# Patient Record
Sex: FEMALE | Race: WHITE | Employment: FULL TIME | URBAN - METROPOLITAN AREA
[De-identification: names, ages, dates, MRNs, and addresses within clinical notes are randomized per-mention and may not be internally consistent; named-entity substitution may affect disease eponyms.]

---

## 2017-02-09 ENCOUNTER — OFFICE VISIT (OUTPATIENT)
Dept: URGENT CARE | Age: 42
End: 2017-02-09

## 2017-02-09 VITALS
TEMPERATURE: 98.2 F | HEART RATE: 92 BPM | HEIGHT: 66 IN | DIASTOLIC BLOOD PRESSURE: 80 MMHG | WEIGHT: 230 LBS | OXYGEN SATURATION: 100 % | SYSTOLIC BLOOD PRESSURE: 120 MMHG | BODY MASS INDEX: 36.96 KG/M2 | RESPIRATION RATE: 16 BRPM

## 2017-02-09 DIAGNOSIS — J02.9 SORE THROAT: ICD-10-CM

## 2017-02-09 DIAGNOSIS — J01.90 ACUTE SINUSITIS, RECURRENCE NOT SPECIFIED, UNSPECIFIED LOCATION: ICD-10-CM

## 2017-02-09 DIAGNOSIS — J02.0 STREP THROAT: Primary | ICD-10-CM

## 2017-02-09 DIAGNOSIS — R05.9 COUGH: ICD-10-CM

## 2017-02-09 LAB — S PYO AG THROAT QL: POSITIVE

## 2017-02-09 PROCEDURE — 99203 OFFICE O/P NEW LOW 30 MIN: CPT | Performed by: EMERGENCY MEDICINE

## 2017-02-09 PROCEDURE — 87880 STREP A ASSAY W/OPTIC: CPT | Performed by: EMERGENCY MEDICINE

## 2017-02-09 RX ORDER — LEVOFLOXACIN 500 MG/1
500 TABLET, FILM COATED ORAL DAILY
Qty: 10 TABLET | Refills: 0 | Status: SHIPPED | OUTPATIENT
Start: 2017-02-09 | End: 2017-02-19

## 2017-02-09 RX ORDER — BENZONATATE 200 MG/1
200 CAPSULE ORAL 3 TIMES DAILY PRN
Qty: 15 CAPSULE | Refills: 0 | Status: SHIPPED | OUTPATIENT
Start: 2017-02-09 | End: 2017-09-18

## 2017-02-09 ASSESSMENT — ENCOUNTER SYMPTOMS
EYE DISCHARGE: 0
SORE THROAT: 1
DIARRHEA: 0
NAUSEA: 0
RHINORRHEA: 1
ABDOMINAL PAIN: 0
SHORTNESS OF BREATH: 1
EYE PAIN: 0
COUGH: 1
SINUS PRESSURE: 1
WHEEZING: 1
VOMITING: 0

## 2017-03-07 ENCOUNTER — EMPLOYEE WELLNESS (OUTPATIENT)
Dept: OTHER | Age: 42
End: 2017-03-07

## 2017-09-05 DIAGNOSIS — S63.601A SPRAIN OF RIGHT THUMB, UNSPECIFIED SITE OF FINGER, INITIAL ENCOUNTER: Primary | ICD-10-CM

## 2017-09-05 PROCEDURE — L3908 WHO COCK-UP NONMOLDE PRE OTS: HCPCS | Performed by: ORTHOPAEDIC SURGERY

## 2017-09-08 ENCOUNTER — OFFICE VISIT (OUTPATIENT)
Dept: ORTHOPEDIC SURGERY | Age: 42
End: 2017-09-08

## 2017-09-08 DIAGNOSIS — M79.644 FINGER PAIN, RIGHT: Primary | ICD-10-CM

## 2017-09-08 DIAGNOSIS — S63.601D SPRAIN OF RIGHT THUMB, UNSPECIFIED SITE OF FINGER, SUBSEQUENT ENCOUNTER: ICD-10-CM

## 2017-09-08 PROCEDURE — 73140 X-RAY EXAM OF FINGER(S): CPT | Performed by: ORTHOPAEDIC SURGERY

## 2017-09-08 PROCEDURE — 99203 OFFICE O/P NEW LOW 30 MIN: CPT | Performed by: ORTHOPAEDIC SURGERY

## 2017-09-15 ENCOUNTER — TELEPHONE (OUTPATIENT)
Dept: ORTHOPEDIC SURGERY | Age: 42
End: 2017-09-15

## 2017-09-15 ENCOUNTER — OFFICE VISIT (OUTPATIENT)
Dept: ORTHOPEDIC SURGERY | Age: 42
End: 2017-09-15

## 2017-09-15 VITALS — HEIGHT: 66 IN | WEIGHT: 229.94 LBS | BODY MASS INDEX: 36.95 KG/M2

## 2017-09-15 DIAGNOSIS — S63.641D GAMEKEEPER'S THUMB OF RIGHT HAND, SUBSEQUENT ENCOUNTER: ICD-10-CM

## 2017-09-15 DIAGNOSIS — M79.5 FOREIGN BODY (FB) IN SOFT TISSUE: ICD-10-CM

## 2017-09-15 PROBLEM — S63.641A GAMEKEEPER'S THUMB OF RIGHT HAND: Status: ACTIVE | Noted: 2017-09-15

## 2017-09-15 PROCEDURE — 99214 OFFICE O/P EST MOD 30 MIN: CPT | Performed by: ORTHOPAEDIC SURGERY

## 2017-09-20 ENCOUNTER — HOSPITAL ENCOUNTER (OUTPATIENT)
Dept: SURGERY | Age: 42
Discharge: HOME | End: 2017-09-20
Attending: ORTHOPAEDIC SURGERY | Admitting: ORTHOPAEDIC SURGERY

## 2017-09-20 VITALS
HEIGHT: 66 IN | DIASTOLIC BLOOD PRESSURE: 75 MMHG | WEIGHT: 229 LBS | RESPIRATION RATE: 18 BRPM | TEMPERATURE: 99.2 F | BODY MASS INDEX: 36.8 KG/M2 | SYSTOLIC BLOOD PRESSURE: 133 MMHG | OXYGEN SATURATION: 97 % | HEART RATE: 89 BPM

## 2017-09-20 LAB — PREGNANCY, URINE: NEGATIVE

## 2017-09-20 RX ORDER — ONDANSETRON 2 MG/ML
4 INJECTION INTRAMUSCULAR; INTRAVENOUS
Status: ACTIVE | OUTPATIENT
Start: 2017-09-20 | End: 2017-09-20

## 2017-09-20 RX ORDER — HYDROCODONE BITARTRATE AND ACETAMINOPHEN 5; 325 MG/1; MG/1
1 TABLET ORAL EVERY 6 HOURS PRN
Qty: 28 TABLET | Refills: 0 | Status: SHIPPED | OUTPATIENT
Start: 2017-09-20 | End: 2017-09-27

## 2017-09-20 RX ORDER — MORPHINE SULFATE 2 MG/ML
2 INJECTION, SOLUTION INTRAMUSCULAR; INTRAVENOUS EVERY 5 MIN PRN
Status: DISCONTINUED | OUTPATIENT
Start: 2017-09-20 | End: 2017-09-21 | Stop reason: HOSPADM

## 2017-09-20 RX ORDER — HYDRALAZINE HYDROCHLORIDE 20 MG/ML
5 INJECTION INTRAMUSCULAR; INTRAVENOUS
Status: DISCONTINUED | OUTPATIENT
Start: 2017-09-20 | End: 2017-09-21 | Stop reason: HOSPADM

## 2017-09-20 RX ORDER — OXYCODONE HYDROCHLORIDE AND ACETAMINOPHEN 5; 325 MG/1; MG/1
1 TABLET ORAL PRN
Status: COMPLETED | OUTPATIENT
Start: 2017-09-20 | End: 2017-09-20

## 2017-09-20 RX ORDER — LABETALOL HYDROCHLORIDE 5 MG/ML
5 INJECTION, SOLUTION INTRAVENOUS EVERY 10 MIN PRN
Status: DISCONTINUED | OUTPATIENT
Start: 2017-09-20 | End: 2017-09-21 | Stop reason: HOSPADM

## 2017-09-20 RX ORDER — MORPHINE SULFATE 2 MG/ML
1 INJECTION, SOLUTION INTRAMUSCULAR; INTRAVENOUS EVERY 5 MIN PRN
Status: DISCONTINUED | OUTPATIENT
Start: 2017-09-20 | End: 2017-09-21 | Stop reason: HOSPADM

## 2017-09-20 RX ORDER — ONDANSETRON 4 MG/1
4 TABLET, FILM COATED ORAL EVERY 8 HOURS PRN
Qty: 10 TABLET | Refills: 1 | Status: SHIPPED | OUTPATIENT
Start: 2017-09-20

## 2017-09-20 RX ORDER — MEPERIDINE HYDROCHLORIDE 50 MG/ML
12.5 INJECTION INTRAMUSCULAR; INTRAVENOUS; SUBCUTANEOUS EVERY 5 MIN PRN
Status: DISCONTINUED | OUTPATIENT
Start: 2017-09-20 | End: 2017-09-21 | Stop reason: HOSPADM

## 2017-09-20 RX ORDER — SODIUM CHLORIDE 0.9 % (FLUSH) 0.9 %
10 SYRINGE (ML) INJECTION EVERY 12 HOURS SCHEDULED
Status: DISCONTINUED | OUTPATIENT
Start: 2017-09-20 | End: 2017-09-21 | Stop reason: HOSPADM

## 2017-09-20 RX ORDER — SODIUM CHLORIDE, SODIUM LACTATE, POTASSIUM CHLORIDE, CALCIUM CHLORIDE 600; 310; 30; 20 MG/100ML; MG/100ML; MG/100ML; MG/100ML
INJECTION, SOLUTION INTRAVENOUS CONTINUOUS
Status: DISCONTINUED | OUTPATIENT
Start: 2017-09-20 | End: 2017-09-21 | Stop reason: HOSPADM

## 2017-09-20 RX ORDER — OXYCODONE HYDROCHLORIDE AND ACETAMINOPHEN 5; 325 MG/1; MG/1
2 TABLET ORAL PRN
Status: COMPLETED | OUTPATIENT
Start: 2017-09-20 | End: 2017-09-20

## 2017-09-20 RX ORDER — LIDOCAINE HYDROCHLORIDE 10 MG/ML
0.3 INJECTION, SOLUTION EPIDURAL; INFILTRATION; INTRACAUDAL; PERINEURAL
Status: ACTIVE | OUTPATIENT
Start: 2017-09-20 | End: 2017-09-20

## 2017-09-20 RX ORDER — DIPHENHYDRAMINE HYDROCHLORIDE 50 MG/ML
12.5 INJECTION INTRAMUSCULAR; INTRAVENOUS
Status: ACTIVE | OUTPATIENT
Start: 2017-09-20 | End: 2017-09-20

## 2017-09-20 RX ORDER — SODIUM CHLORIDE 0.9 % (FLUSH) 0.9 %
10 SYRINGE (ML) INJECTION PRN
Status: DISCONTINUED | OUTPATIENT
Start: 2017-09-20 | End: 2017-09-21 | Stop reason: HOSPADM

## 2017-09-20 RX ADMIN — Medication 1 MG: at 14:20

## 2017-09-20 RX ADMIN — SODIUM CHLORIDE, SODIUM LACTATE, POTASSIUM CHLORIDE, CALCIUM CHLORIDE: 600; 310; 30; 20 INJECTION, SOLUTION INTRAVENOUS at 14:05

## 2017-09-20 RX ADMIN — Medication 0.5 MG: at 14:03

## 2017-09-20 RX ADMIN — OXYCODONE HYDROCHLORIDE AND ACETAMINOPHEN 1 TABLET: 5; 325 TABLET ORAL at 14:31

## 2017-09-20 RX ADMIN — SODIUM CHLORIDE, SODIUM LACTATE, POTASSIUM CHLORIDE, CALCIUM CHLORIDE: 600; 310; 30; 20 INJECTION, SOLUTION INTRAVENOUS at 11:55

## 2017-09-20 ASSESSMENT — PAIN SCALES - GENERAL
PAINLEVEL_OUTOF10: 5
PAINLEVEL_OUTOF10: 3
PAINLEVEL_OUTOF10: 4
PAINLEVEL_OUTOF10: 0

## 2017-09-20 ASSESSMENT — PAIN - FUNCTIONAL ASSESSMENT: PAIN_FUNCTIONAL_ASSESSMENT: 0-10

## 2017-09-21 DIAGNOSIS — S63.641A GAMEKEEPER'S THUMB OF RIGHT HAND, INITIAL ENCOUNTER: Primary | ICD-10-CM

## 2017-09-27 ENCOUNTER — HOSPITAL ENCOUNTER (OUTPATIENT)
Dept: OCCUPATIONAL THERAPY | Age: 42
Discharge: HOME | End: 2017-09-30
Admitting: ORTHOPAEDIC SURGERY

## 2017-09-27 NOTE — PLAN OF CARE
[facility], [address] [place] [address]  Phone: [phone]  Fax [phone]    Occupational Therapy/Hand Therapy Certification  Dear Referring Practitioner: Dr. Royal Acosta    We had the pleasure of evaluating the following patient for occupational therapy services at [address] [place] [address]. A summary of our findings can be found in the initial assessment below. This includes our plan of care. If you have any questions or concerns regarding these findings, please do not hesitate to contact me at the office phone number checked above. Thank you for the referral.     Physician Signature:_______________________________Date:__________________  By signing above (or electronic signature), therapists plan is approved by physician      Patient: Janel Sainz   : 1975   MRN: [id number]  Referring Physician: Referring Practitioner: Dr. Royal Acosta      Evaluation Date: 2017      Medical Diagnosis Information:  Diagnosis: S53. 31XA (ICD-10-CM) - Gamekeeper's thumb of right hand   Treatment Diagnosis: M25.641 right hand stiffness                                         Insurance information: OT Insurance Information: Meidcal [place]    Precautions/ Contra-indications: THUMB SPICA THERMO PLASTIC SPLINT, GENTLE ROM  Latex Allergy:  [x]No      []Yes  Pacemaker:  [x] No       [] Yes     Preferred Language for Healthcare:   [x]English       []other:    G-Code:  Applied on 2017   OT G-codes  Functional Assessment Tool Used: QuickDASH  Score: 86  Functional Limitation: Carrying, moving and handling objects  Carrying, Moving and Handling Objects Current Status (): At least 80 percent but less than 100 percent impaired, limited or restricted  Carrying, Moving and Handling Objects Goal Status ():  At least 20 percent but less than 40 percent impaired, limited or restricted      [x] Patient reported history, allergies, and medications reviewed - see intake form. SUBJECTIVE:  Date of injury:  8/28/17  Date of surgery: 9/20/17 S/P RIGHT THUMB UCL GAMEKEEPER'S REPAIR, REMOVAL DEEP FOREIGN BODY R LONG FINGERTIP    Background/Relevant Medical & Therapy History: Matti Strong going down steps on a cruise ship and had pain and swelling to her right thumb, placed in thumb spica brace when returning home, had MRI and surgical repair was deemed necessary.     Pain Scale: 3/10   [x]Constant      []Intermittent    []other:  Pain Location:  Right thumb  Easing factors: rest  Provocative factors: using right hand for things      Occupational Profile:  Home Enviroment: lives with  [x] spouse,  [x] family 12year old,  [] alone,  [] significant other,   [] other:    Occupation/School:  at [employer] Interests: nothing specific    Prior Level of Function: [x] Independent with ADLs/IADLs     [] Assistance needed (describe):    Patient-Identified Primary Performance Deficits (to be addressed in POC):   [x] bathing washing hair    [x] household tasks (cooking/cleaning) doing dishes   [] dressing    [] self feeding   [] grooming    [x] work/education difficulty writing and typing   [] functional mobility   [] sleeping/rest   [] toileting/hygiene   [] recreational activities   [] driving    [] community/social participation   [] other:     Comorbidities Affecting Functional Performance:     []Anxiety (F41.9)/Depression (F32.9)   []Diabetes Type 1(E10.65) or 2 (E11.65)   []Rheumatoid Arthritis (M05.9)  []Fibromyalgia (M79.7)  []Neuropathy(G60.9)  []Osteoarthritis(M19.91)  [x]None   []Other:     OBJECTIVE:   Date:  Hand Dominance:     [x]  Right    [] Left 9/27/2017     Objective Measures:    PAIN 3/10   Quick DASH  86%   Digit  ROM right hand     WNL except slight stiffness in middle DIP due to removal of piece of glass with this surgery   Thumb ROM MP  10/20  IP  0/10   Thumb opposition  R: barely to side of index  L:   Wrist ROM Ext/Flex WNL   Edema around thumb CMC/MCP Mild by visual inspection    strength in lbs R:defer  L:   Pinch Strengthin lbs: lat  R:defer  L:   Pinch Strength in lbs:  3 point R:defer  L:       Observations (including splints, bandages, incisions, scars):  Upon removal of post-op bandages,stitches in place to radial side of right thumb MCP joint, minimal bleeding on post-op bandages    Sensation:  [x] No reported deficits  [] Intact to light touch    [] [place] Jesus test completed, findings as noted:  [] Other:    Palpation: to surgical incision    Functional Mobility/Transfers/Gait:  [x] Independent - no significant gait deviations  [] Assistance needed   [] Assistive device used: Falls Risk Assessment (30 days):   [x] Falls Risk assessed and no intervention required. [] Falls Risk assessed and Patient requires intervention due to being higher risk   TUG score (>12s at risk):     [] Falls education provided, including      Review Of Systems (ROS): [x]Performed Review of systems (Integumentary, CardioPulmonary, Neurological) by intake and observation. Intake form has been scanned into medical record. Patient has been instructed to contact their primary care physician regarding ROS issues if not already being addressed at this time. ASSESSMENT:   This patient presents with signs and symptoms consistent with the medical diagnosis provided by the referring physician.      Impairments (physical, cognitive and/or psychosocial):  [] Decreased mobility   [x] Weakness    [] Hypersensitivity   [] Pain/tenderness   [x] Edema/swelling   [x] Decreased coordination (fine/gross motor)   [] Impaired body mechanics  [] Sensory loss  [] Loss of balance   [] Other:      Performance Deficits (to be addressed in plan of care):   [x] Bathing    [x] Household Tasks (cooking/cleaning)   [] Dressing    [] Self Feeding   [] Grooming    [] Work/Education   [] Functional Mobility   [] Sleeping/Rest   [] Toileting/Hygiene   [] Recreational Activities   [] Driving    [] Community/Social Participation   [] Other:     Rehab Potential:   [] Excellent [x] Good [] Fair  [] Poor     Barriers affecting rehab potential:  []Age    []Lack of Motivation   []Co-Morbidities  []Cognitive Function  []Environmental/home/work barriers  []Other: Tolerance of evaluation/treatment:    [] Excellent [x] Good [] Fair  [] Poor    PLAN OF CARE:  Interventions:   [x] Therapeutic Exercise [x] Therapeutic Activity    [x] Activities of Daily Living [x] Neuromuscular Re-education      [x] Patient Education  [x] Manual Therapy      [x] Modalities as needed, and not otherwise contraindicated, including: ultrasound,paraffin,moist heat/cold pack, electrical stimulation, contrast bath, iontophoresis  [x] Splinting    Frequency/Duration:  1 days per week for 8 weeks    GOALS:  Short Term Goals: To be achieved in: 2 weeks  1. Independent in HEP and progression per patient tolerance, in order to prevent re-injury. 2. Patient will have a decrease in pain to facilitate improvement in movement, function, and ADLs as indicated by Functional Deficits. Long Term Goals to be achieved in 8  weeks, including patient directed goals to address identified performance deficits:  1) Pt to be independent in graded HEP progression with a good level of effort and compliance. 2) Pt to report a score of 25 % or less on the Quick DASH disability questionnaire for increased performance with carrying, moving, and handling objects. 3) Pt will demonstrate increased ROM to right thumb to within 20 [id number] of left total motion for improved ability to wash hair. 4) Pt will demonstrate increased strength to right  and pinch 60% of left for improved improved ability to lift pots and pans and do dishes. 5) Pt will have a decrease in pain to 2/10 to facilitate improvement in ability to wrist and type outside of splint. Paula Amos   6)    7)    OCCUPATIONAL

## 2017-09-27 NOTE — FLOWSHEET NOTE
[facility], [address] [place] [address] [place]  Phone: [phone]  Fax [phone]  Hand Therapy Daily Treatment Note  Date:  2017    Patient: Jesse Carpenter   : 1975   MRN: [id number]  Referring Physician: Referring Practitioner: Dr. Marshall Rene Diagnosis Information:   Diagnosis: S53. 31XA (ICD-10-CM) - Gamekeeper's thumb of right hand   Treatment Diagnosis: M25.641 right hand stiffness     Date of injury: 17  Date of Surgery/Type of procedure:    17 S/P RIGHT THUMB UCL GAMEKEEPER'S REPAIR, REMOVAL DEEP FOREIGN BODY R LONG FINGERTIP                                    Insurance information:OT Insurance Information: [employer]cal [place]   Comorbidities Affecting Functional Performance:     []Anxiety (F41.9)/Depression (F32.9)   []Diabetes Type 1(E10.65) or 2 (E11.65)   []Rheumatoid Arthritis (M05.9)  []Fibromyalgia (M79.7)  []Neuropathy(G60.9)  []Osteoarthritis(M19.91)  [x]None   []Other:    G-code: OT G-codes  Functional Assessment Tool Used: QuickDASH  Score: 86  Functional Limitation: Carrying, moving and handling objects  Carrying, Moving and Handling Objects Current Status (): At least 80 percent but less than 100 percent impaired, limited or restricted  Carrying, Moving and Handling Objects Goal Status (): At least 20 percent but less than 40 percent impaired, limited or restricted    Date of Patient follow up with Physician:  Preferred Language for Healthcare:   [x]English       []other:  Latex Allergy:  [x]NO      []YES  RESTRICTIONS/PRECAUTIONS:   THUMB SPICA THERMO PLASTIC SPLINT, GENTLE ROM  Progress Note: [x]  Yes  []  No  Next due by : Visit #10       Visit # Insurance Allowable   1 unknown     Pain level:  3/10     SUBJECTIVE:  Thumb was really stiff even before the surgery.     OBJECTIVE:    Date:  Hand Dominance:     [x]  Right    [] Left 2017   Objective Measures:     PAIN 3/10 carrying, lifting, house/yardwork, driving/computer work    Manual Treatments:   [] (23200) Provided manual therapy to mobilize soft tissue/joints of the UE for the purpose of modulating pain, promoting relaxation,  increasing ROM, reducing/eliminating soft tissue swelling/inflammation/restriction, improving soft tissue extensibility and allowing for proper ROM for normal function with self care, reaching, carrying, lifting, house/yardwork, driving/computer work    Splinting:  [x] Fabrication of: wrist and thumb spica IP free  [] (32580) Checkout for orthotic/prosthetic use, established patient   [] (20526) Orthotic management and training (fitting and assessment)  [] Comments:    Charges:  Timed Code Treatment Minutes: 15   Total Treatment Minutes: 50   [x] EVAL (LOW) 02080   [] OT Re-eval (31950)  [] EVAL (MOD) 48276   [] EVAL (HIGH) 63667       [x] Titus (72876) x  1   [] UEBQW(43378)  [] NMR (53547) x      [] Estim (attended) (80230)   [] Manual ([id number]) x       [] US (27893)  [] TA (65980) x      [] Paraffin (60706)  [] ADL  (15054) x     [x] Splint/L code:    [] Estim (unattended) (34791)  [] Other:  [](44754) Checkout for orthotic use, established patient x       [] (93370) Orthotic mgmt & training  x        GOALSShort Term Goals: To be achieved in: 2 weeks  1. Independent in HEP and progression per patient tolerance, in order to prevent re-injury. 2. Patient will have a decrease in pain to facilitate improvement in movement, function, and ADLs as indicated by Functional Deficits.     Long Term Goals to be achieved in 8  weeks, including patient directed goals to address identified performance deficits:  1) Pt to be independent in graded HEP progression with a good level of effort and compliance. 2) Pt to report a score of 25 % or less on the Quick DASH disability questionnaire for increased performance with carrying, moving, and handling objects.   3) Pt will demonstrate increased ROM to right thumb to

## 2017-10-02 ENCOUNTER — OFFICE VISIT (OUTPATIENT)
Dept: ORTHOPEDIC SURGERY | Age: 42
End: 2017-10-02

## 2017-10-02 DIAGNOSIS — M79.5 FOREIGN BODY (FB) IN SOFT TISSUE: ICD-10-CM

## 2017-10-02 DIAGNOSIS — S63.641S GAMEKEEPER'S THUMB OF RIGHT HAND, SEQUELA: Primary | ICD-10-CM

## 2017-10-02 PROCEDURE — 99024 POSTOP FOLLOW-UP VISIT: CPT | Performed by: ORTHOPAEDIC SURGERY

## 2017-10-02 NOTE — MR AVS SNAPSHOT
After Visit Summary             Meryle Ross   10/2/2017 1:00 PM   Office Visit    Description:  Female : 1975   Provider:  Yola Olmstead MD   Department:  [facility]              Your Follow-Up and Future Appointments         Below is a list of your follow-up and future appointments. This may not be a complete list as you may have made appointments directly with providers that we are not aware of or your providers may have made some for you. Please call your providers to confirm appointments. It is important to keep your appointments. Please bring your current insurance card, photo ID, co-pay, and all medication bottles to your appointment. If self-pay, payment is expected at the time of service. Your To-Do List     Future Appointments Provider Department Dept Phone    10/3/2017 7:30 AM Moses Pittman, [address] [place] [address] [phone]    10/10/2017 7:30 AM Moses Pittman, [address] [place] [address] [phone]         Information from Your Visit        Department     Name Address Phone Fax    [facility] [address] [place], [place] [address]lizzy [place]i [address] [phone]      You Were Seen for:         Comments    Gamekeeper's thumb of right hand, sequela   [209011]         Vital Signs     Smoking Status                   Former Smoker           Additional Information about your Body Mass Index (BMI)           Your BMI as listed above is considered obese (30 or more). BMI is an estimate of body fat, calculated from your height and weight. The higher your BMI, the greater your risk of heart disease, high blood pressure, type 2 diabetes, stroke, gallstones, arthritis, sleep apnea, and certain cancers. BMI is not perfect. It may overestimate body fat in athletes and people who are more muscular.   Even a small weight loss (between 5 and 10 percent of your current weight) by decreasing your calorie intake and 4. Enter your Social Security Number ([id number]) and Date of Birth (mm/dd/yyyy) as indicated and click Submit. You will be taken to the next sign-up page. 5. Create a [employer] ID. This will be your [employer] login ID and cannot be changed, so think of one that is secure and easy to remember. 6. Create a [employer] password. You can change your password at any time. 7. Enter your Password Reset Question and Answer. This can be used at a later time if you forget your password. 8. Enter your e-mail address. You will receive e-mail notification when new information is available in [address] 9. Click Sign Up. You can now view your medical record. Additional Information  If you have questions, please contact the physician practice where you receive care. Remember, [employer] is NOT to be used for urgent needs. For medical emergencies, dial 911. For questions regarding your [employer] account call [phone]. If you have a clinical question, please call your doctor's office.

## 2017-10-03 ENCOUNTER — HOSPITAL ENCOUNTER (OUTPATIENT)
Dept: OCCUPATIONAL THERAPY | Age: 42
Discharge: HOME | End: 2017-10-03
Admitting: ORTHOPAEDIC SURGERY

## 2017-10-03 NOTE — FLOWSHEET NOTE
[facility], [address] [place]  Phone: [phone]  Fax [phone]  Hand Therapy Daily Treatment Note  Date:  10/3/2017    Patient: Perry Ayers   : 1975   MRN: [id number]  Referring Physician: Referring Practitioner: Dr. Germán Sexton Diagnosis Information:   Diagnosis: S53. 31XA (ICD-10-CM) - Gamekeeper's thumb of right hand   Treatment Diagnosis: M25.641 right hand stiffness     Date of injury: 17  Date of Surgery/Type of procedure:    17 S/P RIGHT THUMB UCL GAMEKEEPER'S REPAIR, REMOVAL DEEP FOREIGN BODY R LONG FINGERTIP                                    Insurance information:OT Insurance Information: Me[employer]cal [place]   Comorbidities Affecting Functional Performance:     []Anxiety (F41.9)/Depression (F32.9)   []Diabetes Type 1(E10.65) or 2 (E11.65)   []Rheumatoid Arthritis (M05.9)  []Fibromyalgia (M79.7)  []Neuropathy(G60.9)  []Osteoarthritis(M19.91)  [x]None   []Other:    G-code:      Date of Patient follow up with Physician:  Preferred Language for Healthcare:   [x]English       []other:  Latex Allergy:  [x]NO      []YES  RESTRICTIONS/PRECAUTIONS:   THUMB SPICA THERMO PLASTIC SPLINT, GENTLE ROM  Progress Note: [x]  Yes  []  No  Next due by : Visit #10       Visit # Insurance Allowable   2 unknown     Pain level:  3/10     SUBJECTIVE:  Thumb is still very stiff. MD said to take splint off some at home to work on movement more.     OBJECTIVE:    Date:  Hand Dominance:     [x]  Right    [] Left 2017   10/3/17   Objective Measures:      PAIN 3/10    Quick DASH  86%    Digit  ROM right hand    WNL except slight stiffness in middle DIP due to removal of piece of glass with this surgery    Thumb ROM MP  10/20  IP  0/10 10/30  0/30   Thumb opposition  R: barely to side of index  L: To side of long   Wrist ROM Ext/Flex WNL    Edema around thumb CMC/MCP Mild by visual inspection     strength in lbs R:defer  L:    Pinch Strengthin lbs: lat  R:defer  L:    Pinch Strength in lbs:  3 point R:defer  L:          Modalities: 9/27/17   10/3/17        Hot pack with gentle passive thumb flexion 10'              Therapeutic Exercise & Activities:        AROM Thumb ,wrist and digit AROM  AAROM Isolated MP and IP thumb flexion and extension, composite thumb flex/ext, opposition to middle finger        In hand manipulative activities with golf ball and foam roll      Splint fabrication Forearm based wrist and thumb spica full time wear except hygiene and ROM Cut splint down to hand based thumb spica with ample room to work on IP flexion. Manual  Scar and edema mob right thumb and dorsum of hand . Issued small compressive glove with fingers cut out for decreasing edema about thumb. Therapeutic Exercise and NMR EXR  [x] (95569) Provided verbal/tactile cueing for activities related to strengthening, flexibility, endurance, ROM  for improvements in scapular, scapulothoracic and UE control with self care, reaching, carrying, lifting, house/yardwork, driving/computer work. [x] (13890) Provided verbal/tactile cueing for activities related to improving balance, coordination, kinesthetic sense, posture, motor skill, proprioception  to assist with  scapular, scapulothoracic and UE control with self care, reaching, carrying, lifting, house/yardwork, driving/computer work. Therapeutic Activities:    [] ( 16762) therapeutic activities, direct (one on one) patient contact. Use of dynamic activities to improve functional performance.     Activities of Daily Living:  [] (69259) Provided self-care/home management training (i.e., activities of daily living and compensatory training, meal preparation, safety procedures, and instructions in use of assistive technology devices/adaptive equipment)     Home Exercise Program:    [x] (99760) Reviewed/Progressed HEP questionnaire for increased performance with carrying, moving, and handling objects. 3) Pt will demonstrate increased ROM to right thumb to within 20 [id number] of left total motion for improved ability to wash hair. 4) Pt will demonstrate increased strength to right  and pinch 60% of left for improved improved ability to lift pots and pans and do dishes. 5) Pt will have a decrease in pain to 2/10 to facilitate improvement in ability to wrist and type outside of splint. .    Progression Towards Functional goals:  [] Patient is progressing as expected towards functional goals listed. [x] Progression is slowed due to complexities listed. [] Progression has been slowed due to co-morbidities. [] Plan just implemented, too soon to assess goals progression  [] Other:     ASSESSMENT:   Very little early movement  Treatment/Activity Tolerance:  [x] Patient tolerated treatment well [] Patient limited by fatique  [] Patient limited by pain  [] Patient limited by other medical complications  [] Other:     Prognosis: [x] Good [] Fair  [] Poor    Patient Requires Follow-up: [x] Yes  [] No    PLAN: Recommend Occupational Therapy 1-2 times a week for 8 weeks  [] Continue per plan of care [x] Alter current plan (see comments) due to severe thumb stiffness, feel patient could benefit from two times a week for a few weeks.   [x] Plan of care initiated [] Hold pending MD visit [] Discharge    Plan for next session: progress ROM   Thermal modalities, functional activities and strengthening as inicated, AROM, PROM as indicated    Electronically signed by: Juan Miguel Swenson [place] - [id number]

## 2017-10-10 ENCOUNTER — HOSPITAL ENCOUNTER (OUTPATIENT)
Dept: OCCUPATIONAL THERAPY | Age: 42
Discharge: HOME | End: 2017-10-10
Admitting: ORTHOPAEDIC SURGERY

## 2017-10-10 NOTE — FLOWSHEET NOTE
CMC/MCP Mild by visual inspection      strength in lbs R:defer  L:     Pinch Strengthin lbs: lat  R:defer  L:     Pinch Strength in lbs:  3 point R:defer  L:           Modalities: 9/27/17   10/3/17 10/10/17       Hot pack with gentle passive thumb flexion 10' Hot pack with gentle passive flexion tape to right thumb             Therapeutic Exercise & Activities:        AROM Thumb ,wrist and digit AROM  AAROM Isolated MP and IP thumb flexion and extension, composite thumb flex/ext, opposition to middle finger PROM to thumb MCP and IP thumb flexion       In hand manipulative activities with golf ball and foam roll thumbciser with very light rubberband, pressing thumb into soft foam roll,   Picking up one a time, holding multiple in hand and stacking large plastic chips. Splint fabrication Forearm based wrist and thumb spica full time wear except hygiene and ROM Cut splint down to hand based thumb spica with ample room to work on IP flexion. Continue hand based splint with resistive activities,  Can remove splint and use thumb for light nonrestrictive activities to encourage thumb motion     Manual  Scar and edema mob right thumb and dorsum of hand . Issued small compressive glove with fingers cut out for decreasing edema about thumb. Same with gentle MCP and IP anterior posterior glides and gentle joint traction                                                               Therapeutic Exercise and NMR EXR  [x] (10015) Provided verbal/tactile cueing for activities related to strengthening, flexibility, endurance, ROM  for improvements in scapular, scapulothoracic and UE control with self care, reaching, carrying, lifting, house/yardwork, driving/computer work.     [x] (39807) Provided verbal/tactile cueing for activities related to improving balance, coordination, kinesthetic sense, posture, motor skill, proprioception  to assist with  scapular, scapulothoracic and UE control with self care, reaching, carrying, lifting, house/yardwork, driving/computer work. Therapeutic Activities:    [x] ( 35517) therapeutic activities, direct (one on one) patient contact. Use of dynamic activities to improve functional performance. Activities of Daily Living:  [] (16948) Provided self-care/home management training (i.e., activities of daily living and compensatory training, meal preparation, safety procedures, and instructions in use of assistive technology devices/adaptive equipment)     Home Exercise Program:    [x] (20182) Reviewed/Progressed HEP activities related to strengthening, flexibility, endurance, ROM of scapular, scapulothoracic and UE control with self care, reaching, carrying, lifting, house/yardwork, driving/computer work    Manual Treatments:   [x] (77259) Provided manual therapy to mobilize soft tissue/joints of the UE for the purpose of modulating pain, promoting relaxation,  increasing ROM, reducing/eliminating soft tissue swelling/inflammation/restriction, improving soft tissue extensibility and allowing for proper ROM for normal function with self care, reaching, carrying, lifting, house/yardwork, driving/computer work    Splinting:  [x] Fabrication of: wrist and thumb spica IP free  [] (80236) Checkout for orthotic/prosthetic use, established patient   [] (64945) Orthotic management and training (fitting and assessment)  [] Comments:    Charges:  Timed Code Treatment Minutes: 45   Total Treatment Minutes: 50   [] EVAL (LOW) 42890   [] OT Re-eval (56297)  [] EVAL (MOD) 54196   [] EVAL (HIGH) 60337       [x] Titus (93715) x  1   [] QXSRE(65135)  [x] NMR (24452) x  1   [] Estim (attended) (72336)   [x] Manual ([id number]) x  1    [] US (51610)  [] TA (13443) x      [] Paraffin (90577)  [] ADL  ([phone]) x     [x] Splint/L code:    [] Estim (unattended) (09743)  [] Other:  [](04739) Checkout for orthotic use, established patient x       [] (76240) Orthotic mgmt & training  x        GOALSShort Term Goals:  To be achieved in: 2 weeks  1. Independent in HEP and progression per patient tolerance, in order to prevent re-injury. 2. Patient will have a decrease in pain to facilitate improvement in movement, function, and ADLs as indicated by Functional Deficits.     Long Term Goals to be achieved in 8  weeks, including patient directed goals to address identified performance deficits:  1) Pt to be independent in graded HEP progression with a good level of effort and compliance. 2) Pt to report a score of 25 % or less on the Quick DASH disability questionnaire for increased performance with carrying, moving, and handling objects. 3) Pt will demonstrate increased ROM to right thumb to within 20 [id number] of left total motion for improved ability to wash hair. 4) Pt will demonstrate increased strength to right  and pinch 60% of left for improved improved ability to lift pots and pans and do dishes. 5) Pt will have a decrease in pain to 2/10 to facilitate improvement in ability to wrist and type outside of splint. .    Progression Towards Functional goals:  [x] Patient is progressing as expected towards functional goals listed. [] Progression is slowed due to complexities listed. [] Progression has been slowed due to co-morbidities. [] Plan just implemented, too soon to assess goals progression  [] Other:     ASSESSMENT:   Thumb motion improving  Treatment/Activity Tolerance:  [x] Patient tolerated treatment well [] Patient limited by fatique  [] Patient limited by pain  [] Patient limited by other medical complications  [] Other:     Prognosis: [x] Good [] Fair  [] Poor    Patient Requires Follow-up: [x] Yes  [] No    PLAN: Recommend Occupational Therapy 1-2 times a week for 8 weeks  [] Continue per plan of care [x] Alter current plan (see comments) due to severe thumb stiffness, feel patient could benefit from two times a week for a few weeks.   [x] Plan of care initiated [] Hold pending MD visit [] Discharge    Plan

## 2017-10-17 ENCOUNTER — HOSPITAL ENCOUNTER (OUTPATIENT)
Dept: OCCUPATIONAL THERAPY | Age: 42
Discharge: HOME | End: 2017-10-17
Admitting: ORTHOPAEDIC SURGERY

## 2017-10-17 NOTE — FLOWSHEET NOTE
CMC/MCP Mild by visual inspection      strength in lbs R:defer  L:     Pinch Strengthin lbs: lat  R:defer  L:     Pinch Strength in lbs:  3 point R:defer  L:           Modalities: 9/27/17   10/3/17 10/10/17 10/17/17       Hot pack with gentle passive thumb flexion 10' Hot pack with gentle passive flexion tape to right thumb HP and para             Therapeutic Exercise & Activities:        AROM Thumb ,wrist and digit AROM  AAROM Isolated MP and IP thumb flexion and extension, composite thumb flex/ext, opposition to middle finger PROM to thumb MCP and IP thumb flexion A/PROM thumb       In hand manipulative activities with golf ball and foam roll thumbciser with very light rubberband, pressing thumb into soft foam roll,   Picking up one a time, holding multiple in hand and stacking large plastic chips. Kneading beans, grasp and release foam blocks, manipulate small checkers    Splint fabrication Forearm based wrist and thumb spica full time wear except hygiene and ROM Cut splint down to hand based thumb spica with ample room to work on IP flexion. Continue hand based splint with resistive activities,  Can remove splint and use thumb for light nonrestrictive activities to encourage thumb motion     Manual  Scar and edema mob right thumb and dorsum of hand . Issued small compressive glove with fingers cut out for decreasing edema about thumb. Same with gentle MCP and IP anterior posterior glides and gentle joint traction DTM, scar mass, and gentle distraction         Added thumb abd/ add, scar mass, and blocking to MP joint to HEP 10/17/17                                                       Therapeutic Exercise and NMR EXR  [x] (88005) Provided verbal/tactile cueing for activities related to strengthening, flexibility, endurance, ROM  for improvements in scapular, scapulothoracic and UE control with self care, reaching, carrying, lifting, house/yardwork, driving/computer work.     [x] (31968) Provided plan of care [x] Alter current plan (see comments) due to severe thumb stiffness, feel patient could benefit from two times a week for a few weeks.   [] Plan of care initiated [] Hold pending MD visit [] Discharge    Plan for next session: progress ROM   Thermal modalities, functional activities and strengthening as indicated, AROM, PROM as indicated    Electronically signed by: Ras Guy

## 2017-10-24 ENCOUNTER — HOSPITAL ENCOUNTER (OUTPATIENT)
Dept: OCCUPATIONAL THERAPY | Age: 42
Discharge: HOME | End: 2017-10-24
Admitting: ORTHOPAEDIC SURGERY

## 2017-10-24 NOTE — FLOWSHEET NOTE
glove with fingers cut out for decreasing edema about thumb. Same with gentle MCP and IP anterior posterior glides and gentle joint traction DTM, scar mass, and gentle distraction  Scar mobilization joint distraction, anterior posterior glides to thumb joints       Added thumb abd/ add, scar mass, and blocking to MP joint to HEP 10/17/17  Rolling golf ball in fingertips and thumb, thumb ciser        Added dynamic flexion cuff to IP joint in thumb spica splint to wear periodically throughout the day                                             Therapeutic Exercise and NMR EXR  [x] (28015) Provided verbal/tactile cueing for activities related to strengthening, flexibility, endurance, ROM  for improvements in scapular, scapulothoracic and UE control with self care, reaching, carrying, lifting, house/yardwork, driving/computer work.    [] (86200) Provided verbal/tactile cueing for activities related to improving balance, coordination, kinesthetic sense, posture, motor skill, proprioception  to assist with  scapular, scapulothoracic and UE control with self care, reaching, carrying, lifting, house/yardwork, driving/computer work. Therapeutic Activities:    [x] ( 53541) therapeutic activities, direct (one on one) patient contact. Use of dynamic activities to improve functional performance.     Activities of Daily Living:  [] (13145) Provided self-care/home management training (i.e., activities of daily living and compensatory training, meal preparation, safety procedures, and instructions in use of assistive technology devices/adaptive equipment)     Home Exercise Program:    [x] (42797) Reviewed/Progressed HEP activities related to strengthening, flexibility, endurance, ROM of scapular, scapulothoracic and UE control with self care, reaching, carrying, lifting, house/yardwork, driving/computer work    Manual Treatments:   [x] (44174) Provided manual therapy to mobilize soft tissue/joints of the UE for the purpose of modulating pain, promoting relaxation,  increasing ROM, reducing/eliminating soft tissue swelling/inflammation/restriction, improving soft tissue extensibility and allowing for proper ROM for normal function with self care, reaching, carrying, lifting, house/yardwork, driving/computer work    Splinting:  [] Fabrication of: wrist and thumb spica IP free  [] (68810) Checkout for orthotic/prosthetic use, established patient   [] (19443) Orthotic management and training (fitting and assessment)  [] Comments:    Charges:  Timed Code Treatment Minutes: 40   Total Treatment Minutes: 50   [] EVAL (LOW) 07995   [] OT Re-eval (87595)  [] EVAL (MOD) 07293   [] EVAL (HIGH) 46739       [x] Titus (20705) x  1   [] GRMAB(30269)  [x] NMR (45309) x  1   [] Estim (attended) (09355)   [x] Manual ([id number]) x  1    [] US (80314)  [] TA (81358) x      [x] Paraffin (33179)  [] ADL  (02147) x     [] Splint/L code:    [] Estim (unattended) (18301)  [] Other:  [](76530) Checkout for orthotic use, established patient x       [] (09180) Orthotic mgmt & training  x        GOALSShort Term Goals: To be achieved in: 2 weeks  1. Independent in HEP and progression per patient tolerance, in order to prevent re-injury. 2. Patient will have a decrease in pain to facilitate improvement in movement, function, and ADLs as indicated by Functional Deficits.     Long Term Goals to be achieved in 8  weeks, including patient directed goals to address identified performance deficits:  1) Pt to be independent in graded HEP progression with a good level of effort and compliance. 2) Pt to report a score of 25 % or less on the Quick DASH disability questionnaire for increased performance with carrying, moving, and handling objects. 3) Pt will demonstrate increased ROM to right thumb to within 20 [id number] of left total motion for improved ability to wash hair.   4) Pt will demonstrate increased strength to right  and pinch 60% of left for improved improved ability to lift pots and pans and do dishes. 5) Pt will have a decrease in pain to 2/10 to facilitate improvement in ability to wrist and type outside of splint. .    Progression Towards Functional goals:  [x] Patient is progressing as expected towards functional goals listed. [] Progression is slowed due to complexities listed. [] Progression has been slowed due to co-morbidities. [] Plan just implemented, too soon to assess goals progression  [] Other:     ASSESSMENT:   Thumb motion improving  Treatment/Activity Tolerance:  [x] Patient tolerated treatment well [] Patient limited by fatique  [] Patient limited by pain  [] Patient limited by other medical complications  [] Other:     Prognosis: [x] Good [] Fair  [] Poor    Patient Requires Follow-up: [x] Yes  [] No    PLAN: Recommend Occupational Therapy 1-2 times a week for 8 weeks  [x] Continue per plan of care [x] Alter current plan (see comments) due to severe thumb stiffness, feel patient could benefit from two times a week for a few weeks.   [] Plan of care initiated [] Hold pending MD visit [] Discharge    Plan for next session: progress ROM   Thermal modalities, functional activities and strengthening as indicated, AROM, PROM as indicated    Electronically signed by: Alexander Meyer, [address] [place] [id number]

## 2017-10-26 ENCOUNTER — OFFICE VISIT (OUTPATIENT)
Dept: ORTHOPEDIC SURGERY | Age: 42
End: 2017-10-26

## 2017-10-26 VITALS — WEIGHT: 229.06 LBS | BODY MASS INDEX: 36.81 KG/M2 | HEIGHT: 66 IN

## 2017-10-26 DIAGNOSIS — S63.641D GAMEKEEPER'S THUMB OF RIGHT HAND, SUBSEQUENT ENCOUNTER: Primary | ICD-10-CM

## 2017-10-26 PROCEDURE — 99024 POSTOP FOLLOW-UP VISIT: CPT | Performed by: ORTHOPAEDIC SURGERY

## 2017-10-26 NOTE — PROGRESS NOTES
Chief Complaint:  Post-Op Check (PO RT THUMB UCL REPAIR 09/20/2017)      History of Present of Illness:  She is now approximately 4-1/2 weeks out from right thumb gamekeeper's repair. She has been using a removable splint and the main issue has actually been stiffness. Examination:  Incisions have healed nicely. There is a fair amount of scar along her thumb and commence with stiffness. She has at her IP joint and active range of motion of 0-40° and at the MP joint 10-30°. There is excellent stability when I stress the ulnar collateral ligament. Radiology:     X-rays obtained and reviewed in office:  Views    Location    Impression      No orders of the defined types were placed in this encounter. Impression:  Encounter Diagnosis   Name Primary?  Gamekeeper's thumb of right hand, subsequent encounter Yes         Treatment Plan:  Postop stiffness after right thumb gamekeeper's ulnar collateral ligament repair. Think she may transition away from splint use and really keep working on her range of motion and functional tasks. She may start to add strengthening as her comfort allows. I will see her back for a final check in 1 month. Please note that this transcription was created using voice recognition software. Any errors are unintentional and may be due to voice recognition transcription.

## 2017-10-31 ENCOUNTER — HOSPITAL ENCOUNTER (OUTPATIENT)
Dept: OCCUPATIONAL THERAPY | Age: 42
Discharge: HOME | End: 2017-10-31
Admitting: ORTHOPAEDIC SURGERY

## 2017-10-31 NOTE — FLOWSHEET NOTE
[facility], [address] [place] [address]  Phone: [phone]  Fax [phone]  Hand Therapy Daily Treatment Note  Date:  10/31/2017    Patient: Jessica Mccormack   : 1975   MRN: [id number]  Referring Physician: Referring Practitioner: Dr. Karson Castillo Diagnosis Information:   Diagnosis: S53. 31XA (ICD-10-CM) - Gamekeeper's thumb of right hand   Treatment Diagnosis: M25.641 right hand stiffness     Date of injury: 17  Date of Surgery/Type of procedure:    17 S/P RIGHT THUMB UCL GAMEKEEPER'S REPAIR, REMOVAL DEEP FOREIGN BODY R LONG FINGERTIP                                    Insurance information:OT Insurance Information: [employer]cal [place]   Comorbidities Affecting Functional Performance:     []Anxiety (F41.9)/Depression (F32.9)   []Diabetes Type 1(E10.65) or 2 (E11.65)   []Rheumatoid Arthritis (M05.9)  []Fibromyalgia (M79.7)  []Neuropathy(G60.9)  []Osteoarthritis(M19.91)  [x]None   []Other:    G-code:   Functional Assessment Tool Used: QuickDASH  Score: 86  Functional Limitation: Carrying, moving and handling objects  Carrying, Moving and Handling Objects Current Status (): At least 80 percent but less than 100 percent impaired, limited or restricted  Carrying, Moving and Handling Objects Goal Status (): At least 20 percent but less than 40 percent impaired, limited or restricted    Date of Patient follow up with Physician: 10/26/17  Preferred Language for Healthcare:   [x]English       []other:  Latex Allergy:  [x]NO      []YES  RESTRICTIONS/PRECAUTIONS:   DC splint, progress ROM and strength   Progress Note: [x]  Yes  []  No  Next due by : Visit #10       Visit # Insurance Allowable   6 unknown     Pain level:  2/10 with movement      SUBJECTIVE:  Almost 6 weeks out. MD said D/C splint except for protection. Felt a pop the other day and then it moved more and felt better.      OBJECTIVE: Date:  Hand Dominance:     [x]  Right    [] Left 9/27/2017   10/3/17  10/10/17  10/24/17   Objective Measures:        PAIN 3/10      Quick DASH  86%      Digit  ROM right hand    WNL except slight stiffness in middle DIP due to removal of piece of glass with this surgery      Thumb ROM MP  10/20  IP  0/10 10/30  0/30 35  40 30  40   Thumb opposition  R: barely to side of index  L: To side of long To tip of ring    Wrist ROM Ext/Flex WNL      Edema around thumb CMC/MCP Mild by visual inspection       strength in lbs R:defer  L:      Pinch Strengthin lbs: lat  R:defer  L:      Pinch Strength in lbs:  3 point R:defer  L:            Modalities: 9/27/17   10/3/17 10/10/17 10/17/17  10/24/17 10/31/17      Hot pack with gentle passive thumb flexion 10' Hot pack with gentle passive flexion tape to right thumb HP and para  HP and paraffin with passive thumb flexion 10' HP and para             Therapeutic Exercise & Activities:         AROM Thumb ,wrist and digit AROM  AAROM Isolated MP and IP thumb flexion and extension, composite thumb flex/ext, opposition to middle finger PROM to thumb MCP and IP thumb flexion A/PROM thumb  PROM thumb flexion, blocked IP and MCP flexion, composite flexion and extension, circumduction PROM thumb isolated and composite      In hand manipulative activities with golf ball and foam roll thumbciser with very light rubberband, pressing thumb into soft foam roll,   Picking up one a time, holding multiple in hand and stacking large plastic chips. Kneading beans, grasp and release foam blocks, manipulate small checkers Squeezing and manipulating warm paraffin, pressing thumb into warm paraffin Same as last session     roll and pinch putty     Putty with sticks and foam with red clip    Splint fabrication Forearm based wrist and thumb spica full time wear except hygiene and ROM Cut splint down to hand based thumb spica with ample room to work on IP flexion.  Continue hand based splint with resistive activities,  Can remove splint and use thumb for light nonrestrictive activities to encourage thumb motion      Manual  Scar and edema mob right thumb and dorsum of hand . Issued small compressive glove with fingers cut out for decreasing edema about thumb. Same with gentle MCP and IP anterior posterior glides and gentle joint traction DTM, scar mass, and gentle distraction  Scar mobilization joint distraction, anterior posterior glides to thumb joints Same        Added thumb abd/ add, scar mass, and blocking to MP joint to HEP 10/17/17  Rolling golf ball in fingertips and thumb, thumb ciser         Added dynamic flexion cuff to IP joint in thumb spica splint to wear periodically throughout the day                                                   Therapeutic Exercise and NMR EXR  [x] (54073) Provided verbal/tactile cueing for activities related to strengthening, flexibility, endurance, ROM  for improvements in scapular, scapulothoracic and UE control with self care, reaching, carrying, lifting, house/yardwork, driving/computer work.    [] (87633) Provided verbal/tactile cueing for activities related to improving balance, coordination, kinesthetic sense, posture, motor skill, proprioception  to assist with  scapular, scapulothoracic and UE control with self care, reaching, carrying, lifting, house/yardwork, driving/computer work. Therapeutic Activities:    [x] ( 09358) therapeutic activities, direct (one on one) patient contact. Use of dynamic activities to improve functional performance.     Activities of Daily Living:  [] (59669) Provided self-care/home management training (i.e., activities of daily living and compensatory training, meal preparation, safety procedures, and instructions in use of assistive technology devices/adaptive equipment)     Home Exercise Program:    [x] (20183) Reviewed/Progressed HEP activities related to strengthening, flexibility, endurance, ROM of scapular, scapulothoracic and UE control with self care, reaching, carrying, lifting, house/yardwork, driving/computer work    Manual Treatments:  As above  [x] (68643) Provided manual therapy to mobilize soft tissue/joints of the UE for the purpose of modulating pain, promoting relaxation,  increasing ROM, reducing/eliminating soft tissue swelling/inflammation/restriction, improving soft tissue extensibility and allowing for proper ROM for normal function with self care, reaching, carrying, lifting, house/yardwork, driving/computer work    Splinting:  [] Fabrication of: wrist and thumb spica IP free  [] (57432) Checkout for orthotic/prosthetic use, established patient   [] (52816) Orthotic management and training (fitting and assessment)  [] Comments:    Charges:  Timed Code Treatment Minutes: 40   Total Treatment Minutes: 50   [] EVAL (LOW) 30221   [] OT Re-eval (36702)  [] EVAL (MOD) 57666   [] EVAL (HIGH) 47603       [x] Titus (10109) x  1   [] PSSVY(59098)  [x] NMR (55316) x  1   [] Estim (attended) (67557)   [x] Manual ([id number]) x  1    [] US (23418)  [] TA (93236) x      [x] Paraffin (73635)  [] ADL  (16272) x     [] Splint/L code:    [] Estim (unattended) (12759)  [] Other:  [](87892) Checkout for orthotic use, established patient x       [] (57774) Orthotic mgmt & training  x        GOALSShort Term Goals: To be achieved in: 2 weeks  1. Independent in HEP and progression per patient tolerance, in order to prevent re-injury. 2. Patient will have a decrease in pain to facilitate improvement in movement, function, and ADLs as indicated by Functional Deficits.     Long Term Goals to be achieved in 8  weeks, including patient directed goals to address identified performance deficits:  1) Pt to be independent in graded HEP progression with a good level of effort and compliance. 2) Pt to report a score of 25 % or less on the Quick DASH disability questionnaire for increased performance with carrying, moving, and handling objects.   3) Pt will demonstrate increased ROM to right thumb to within 20 [id number] of left total motion for improved ability to wash hair. 4) Pt will demonstrate increased strength to right  and pinch 60% of left for improved improved ability to lift pots and pans and do dishes. 5) Pt will have a decrease in pain to 2/10 to facilitate improvement in ability to wrist and type outside of splint. .    Progression Towards Functional goals:  [x] Patient is progressing as expected towards functional goals listed. [] Progression is slowed due to complexities listed. [] Progression has been slowed due to co-morbidities. [] Plan just implemented, too soon to assess goals progression  [] Other:     ASSESSMENT:   Thumb motion improving  Treatment/Activity Tolerance:  [x] Patient tolerated treatment well [] Patient limited by fatique  [] Patient limited by pain  [] Patient limited by other medical complications  [] Other:     Prognosis: [x] Good [] Fair  [] Poor    Patient Requires Follow-up: [x] Yes  [] No    PLAN: Recommend Occupational Therapy 1-2 times a week for 8 weeks  [x] Continue per plan of care [x] Alter current plan (see comments) due to severe thumb stiffness, feel patient could benefit from two times a week for a few weeks.   [] Plan of care initiated [] Hold pending MD visit [] Discharge    Plan for next session: progress ROM   Thermal modalities, functional activities and strengthening as indicated, AROM, PROM as indicated    Electronically signed by: Nuvia Ryan

## 2017-11-01 ENCOUNTER — HOSPITAL ENCOUNTER (OUTPATIENT)
Dept: OCCUPATIONAL THERAPY | Age: 42
Discharge: HOME | End: 2017-11-30
Attending: ORTHOPAEDIC SURGERY | Admitting: ORTHOPAEDIC SURGERY

## 2017-11-07 ENCOUNTER — HOSPITAL ENCOUNTER (OUTPATIENT)
Dept: OCCUPATIONAL THERAPY | Age: 42
Discharge: HOME | End: 2017-11-07
Admitting: ORTHOPAEDIC SURGERY

## 2017-11-07 NOTE — FLOWSHEET NOTE
[facility], [address] [place] [address] [place]  Phone: [phone]  Fax [phone]  Hand Therapy Daily Treatment Note  Date:  2017    Patient: Yamilet Winston   : 1975   MRN: [id number]  Referring Physician: Referring Practitioner: Dr. Arjnu Pollock Diagnosis Information:   Diagnosis: S53. 31XA (ICD-10-CM) - Gamekeeper's thumb of right hand   Treatment Diagnosis: M25.641 right hand stiffness     Date of injury: 17  Date of Surgery/Type of procedure:    17 S/P RIGHT THUMB UCL GAMEKEEPER'S REPAIR, REMOVAL DEEP FOREIGN BODY R LONG FINGERTIP                                    Insurance information:OT Insurance Information: Meidcal [place]   Comorbidities Affecting Functional Performance:     []Anxiety (F41.9)/Depression (F32.9)   []Diabetes Type 1(E10.65) or 2 (E11.65)   []Rheumatoid Arthritis (M05.9)  []Fibromyalgia (M79.7)  []Neuropathy(G60.9)  []Osteoarthritis(M19.91)  [x]None   []Other:    G-code:   Functional Assessment Tool Used: QuickDASH  Score: 86  Functional Limitation: Carrying, moving and handling objects  Carrying, Moving and Handling Objects Current Status (): At least 80 percent but less than 100 percent impaired, limited or restricted  Carrying, Moving and Handling Objects Goal Status (): At least 20 percent but less than 40 percent impaired, limited or restricted    Date of Patient follow up with Physician: 10/26/17  Preferred Language for Healthcare:   [x]English       []other:  Latex Allergy:  [x]NO      []YES  RESTRICTIONS/PRECAUTIONS:   DC splint, progress ROM and strength   Progress Note: [x]  Yes  []  No  Next due by : Visit #10       Visit # Insurance Allowable   7 unknown     Pain level:  2/10 with movement      SUBJECTIVE:  Going without splint with little to no pain. 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  He felt another adhesion pop in thumb and had immediate improvement in motion. OBJECTIVE:    Date:  Hand Dominance:     [x]  Right    [] Left 9/27/2017   10/3/17  10/10/17  10/24/17 11/7/17   Objective Measures:         PAIN 3/10       Quick DASH  86%       Digit  ROM right hand    WNL except slight stiffness in middle DIP due to removal of piece of glass with this surgery       Thumb ROM MP  10/20  IP  0/10 10/30  0/30 35  40 30  40 45  55   Thumb opposition  R: barely to side of index  L: To side of long To tip of ring     Wrist ROM Ext/Flex WNL       Edema around thumb CMC/MCP Mild by visual inspection        strength in lbs R:defer  L:    42  75   Pinch Strengthin lbs: lat  R:defer  L:    6  17   Pinch Strength in lbs:  3 point R:defer  L:    5  15         Modalities: 9/27/17   10/3/17 10/10/17 10/17/17  10/24/17 10/31/17  11/7/17     Hot pack with gentle passive thumb flexion 10' Hot pack with gentle passive flexion tape to right thumb HP and para  HP and paraffin with passive thumb flexion 10' HP and para  HP with flexion wrap 10'             Therapeutic Exercise & Activities:          AROM Thumb ,wrist and digit AROM  AAROM Isolated MP and IP thumb flexion and extension, composite thumb flex/ext, opposition to middle finger PROM to thumb MCP and IP thumb flexion A/PROM thumb  PROM thumb flexion, blocked IP and MCP flexion, composite flexion and extension, circumduction PROM thumb isolated and composite  PROM to thumb flexion and extension. AROM composite and isolated joint motion     In hand manipulative activities with golf ball and foam roll thumbciser with very light rubberband, pressing thumb into soft foam roll,   Picking up one a time, holding multiple in hand and stacking large plastic chips.  Kneading beans, grasp and release foam blocks, manipulate small checkers Squeezing and manipulating warm paraffin, pressing thumb into warm paraffin Same as last session     roll and pinch putty     Putty with sticks and foam with red clip  [address] Provided self-care/home management training (i.e., activities of daily living and compensatory training, meal preparation, safety procedures, and instructions in use of assistive technology devices/adaptive equipment)     Home Exercise Program:  jensene updated handout, PROM to thumb resistied pinch,  and extension activities added  [x] [phone]) Reviewed/Progressed HEP activities related to strengthening, flexibility, endurance, ROM of scapular, scapulothoracic and UE control with self care, reaching, carrying, lifting, house/yardwork, driving/computer work    Manual Treatments:  As above  [x] ([id number]) Provided manual therapy to mobilize soft tissue/joints of the UE for the purpose of modulating pain, promoting relaxation,  increasing ROM, reducing/eliminating soft tissue swelling/inflammation/restriction, improving soft tissue extensibility and allowing for proper ROM for normal function with self care, reaching, carrying, lifting, house/yardwork, driving/computer work    Splinting:  [] Fabrication of: wrist and thumb spica IP free  [] (52384) Checkout for orthotic/prosthetic use, established patient   [] (40133) Orthotic management and training (fitting and assessment)  [] Comments:    Charges:  Timed Code Treatment Minutes: 40   Total Treatment Minutes: 50   [] EVAL (LOW) 96182   [] OT Re-eval (81116)  [] EVAL (MOD) 98032   [] EVAL (HIGH) 39336       [x] Titus (43106) x  1   [] CYFGO(45013)  [] NMR (48338) x      [] Estim (attended) (99283)   [x] Manual ([id number]) x  1    [] US (40163)  [x] TA (32502) x  1   [] Paraffin (49532)  [] ADL  (23 [phone] 60) x     [] Splint/L code:    [] Estim (unattended) (97521)  [] Other:  [](56383) Checkout for orthotic use, established patient x       [] (74263) Orthotic mgmt & training  x        GOALSShort Term Goals: To be achieved in: 2 weeks  1. Independent in HEP and progression per patient tolerance, in order to prevent re-injury.    2. Patient will have a decrease in pain to facilitate improvement in movement, function, and ADLs as indicated by Functional Deficits.     Long Term Goals to be achieved in 8  weeks, including patient directed goals to address identified performance deficits:  1) Pt to be independent in graded HEP progression with a good level of effort and compliance. 2) Pt to report a score of 25 % or less on the Quick DASH disability questionnaire for increased performance with carrying, moving, and handling objects. 3) Pt will demonstrate increased ROM to right thumb to within 20 [id number] of left total motion for improved ability to wash hair. 4) Pt will demonstrate increased strength to right  and pinch 60% of left for improved improved ability to lift pots and pans and do dishes. 5) Pt will have a decrease in pain to 2/10 to facilitate improvement in ability to wrist and type outside of splint. .    Progression Towards Functional goals:  [x] Patient is progressing as expected towards functional goals listed. [] Progression is slowed due to complexities listed. [] Progression has been slowed due to co-morbidities. [] Plan just implemented, too soon to assess goals progression  [] Other:     ASSESSMENT:   Thumb motion improving  Treatment/Activity Tolerance:  [x] Patient tolerated treatment well [] Patient limited by fatique  [] Patient limited by pain  [] Patient limited by other medical complications  [] Other:     Prognosis: [x] Good [] Fair  [] Poor    Patient Requires Follow-up: [x] Yes  [] No    PLAN: Recommend Occupational Therapy 1-2 times a week for 8 weeks. Reassess next visit and determine need for continued therapy.   [x] Continue per plan of care [] Alter current plan (see comments)   [] Plan of care initiated [] Hold pending MD visit [] Discharge    Plan for next session: progress ROM and resistive activities   Thermal modalities, functional activities and strengthening as indicated, AROM, PROM as indicated    Electronically signed by: Vance Baker

## 2017-11-14 ENCOUNTER — HOSPITAL ENCOUNTER (OUTPATIENT)
Dept: OCCUPATIONAL THERAPY | Age: 42
Discharge: HOME | End: 2017-11-14
Admitting: ORTHOPAEDIC SURGERY

## 2017-11-14 NOTE — FLOWSHEET NOTE
[facility], [address] [place]  Phone: [phone]  Fax [phone]  Hand Therapy Daily Treatment Note  Date:  2017    Patient: Maggy Hanson   : 1975   MRN: [id number]  Referring Physician: Referring Practitioner: Dr. Lico Lopez Diagnosis Information:   Diagnosis: S53. 31XA (ICD-10-CM) - Gamekeeper's thumb of right hand   Treatment Diagnosis: M25.641 right hand stiffness     Date of injury: 17  Date of Surgery/Type of procedure:    17 S/P RIGHT THUMB UCL GAMEKEEPER'S REPAIR, REMOVAL DEEP FOREIGN BODY R LONG FINGERTIP                                    Insurance information:OT Insurance Information: Meidcal [place]   Comorbidities Affecting Functional Performance:     []Anxiety (F41.9)/Depression (F32.9)   []Diabetes Type 1(E10.65) or 2 (E11.65)   []Rheumatoid Arthritis (M05.9)  []Fibromyalgia (M79.7)  []Neuropathy(G60.9)  []Osteoarthritis(M19.91)  [x]None   []Other:    G-code:   Functional Assessment Tool Used: QuickDASH  Score: 86  Functional Limitation: Carrying, moving and handling objects  Carrying, Moving and Handling Objects Current Status (): At least 80 percent but less than 100 percent impaired, limited or restricted  Carrying, Moving and Handling Objects Goal Status (): At least 20 percent but less than 40 percent impaired, limited or restricted    Date of Patient follow up with Physician: 10/26/17  Preferred Language for Healthcare:   [x]English       []other:  Latex Allergy:  [x]NO      []YES  RESTRICTIONS/PRECAUTIONS:   DC splint, progress ROM and strength   Progress Note: [x]  Yes  []  No  Next due by : Visit #10       Visit # Insurance Allowable   9 unknown     Pain level:  0/10 with movement only with PROM     SUBJECTIVE:  Doing well. Able to do more but still very stiff.      .   OBJECTIVE:    Date:  Hand Dominance:     [x]  Right    [] Left 2017   10/3/17 lifting, house/yardwork, driving/computer work. Therapeutic Activities:    [x] ( 76556) therapeutic activities, direct (one on one) patient contact. Use of dynamic activities to improve functional performance.     Activities of Daily Living:  [] (60330) Provided self-care/home management training (i.e., activities of daily living and compensatory training, meal preparation, safety procedures, and instructions in use of assistive technology devices/adaptive equipment)     Home Exercise Program:  sewe updated handout, PROM to thumb resistied pinch,  and extension activities added  [x] [phone]) Reviewed/Progressed HEP activities related to strengthening, flexibility, endurance, ROM of scapular, scapulothoracic and UE control with self care, reaching, carrying, lifting, house/yardwork, driving/computer work    Manual Treatments:  As above  [x] ([id number]) Provided manual therapy to mobilize soft tissue/joints of the UE for the purpose of modulating pain, promoting relaxation,  increasing ROM, reducing/eliminating soft tissue swelling/inflammation/restriction, improving soft tissue extensibility and allowing for proper ROM for normal function with self care, reaching, carrying, lifting, house/yardwork, driving/computer work    Splinting:  [] Fabrication of: wrist and thumb spica IP free  [] (81599) Checkout for orthotic/prosthetic use, established patient   [] (72086) Orthotic management and training (fitting and assessment)  [] Comments:    Charges:  Timed Code Treatment Minutes: 40   Total Treatment Minutes: 50   [] EVAL (LOW) 62447   [] OT Re-eval (11066)  [] EVAL (MOD) 48352   [] EVAL (HIGH) 83914       [x] Titus (53493) x  1   [] HQDYB(52973)  [] NMR (25784) x      [] Estim (attended) (39249)   [x] Manual ([id number]) x  1    [] US (41404)  [x] TA (49232) x  1   [x] Paraffin (41018)  [] ADL  (13 [phone] 24 60) x     [] Splint/L code:    [] Estim (unattended) (50304)  [] Other:  [](44025) Checkout for orthotic use, established patient x       [] (54179) Orthotic mgmt & training  x        GOALSShort Term Goals: To be achieved in: 2 weeks  1. Independent in HEP and progression per patient tolerance, in order to prevent re-injury. 2. Patient will have a decrease in pain to facilitate improvement in movement, function, and ADLs as indicated by Functional Deficits.     Long Term Goals to be achieved in 8  weeks, including patient directed goals to address identified performance deficits:  1) Pt to be independent in graded HEP progression with a good level of effort and compliance. 2) Pt to report a score of 25 % or less on the Quick DASH disability questionnaire for increased performance with carrying, moving, and handling objects. 3) Pt will demonstrate increased ROM to right thumb to within 20 [id number] of left total motion for improved ability to wash hair. 4) Pt will demonstrate increased strength to right  and pinch 60% of left for improved improved ability to lift pots and pans and do dishes. 5) Pt will have a decrease in pain to 2/10 to facilitate improvement in ability to wrist and type outside of splint. .    Progression Towards Functional goals:  [x] Patient is progressing as expected towards functional goals listed. [] Progression is slowed due to complexities listed. [] Progression has been slowed due to co-morbidities. [] Plan just implemented, too soon to assess goals progression  [] Other:     ASSESSMENT:   Thumb motion improving  Treatment/Activity Tolerance:  [x] Patient tolerated treatment well [] Patient limited by fatique  [] Patient limited by pain  [] Patient limited by other medical complications  [] Other:     Prognosis: [x] Good [] Fair  [] Poor    Patient Requires Follow-up: [x] Yes  [] No    PLAN: Recommend Occupational Therapy 1-2 times a week for 8 weeks. Reassess next visit and determine need for continued therapy.   [x] Continue per plan of care [] Alter current plan (see comments)   [] Plan of care initiated [] Hold pending MD visit [] Discharge    Plan for next session: progress ROM and resistive activities   Thermal modalities, functional activities and strengthening as indicated, AROM, PROM as indicated    Electronically signed by: Pierre White

## 2017-11-30 ENCOUNTER — OFFICE VISIT (OUTPATIENT)
Dept: ORTHOPEDIC SURGERY | Age: 42
End: 2017-11-30

## 2017-11-30 DIAGNOSIS — S63.641D GAMEKEEPER'S THUMB OF RIGHT HAND, SUBSEQUENT ENCOUNTER: Primary | ICD-10-CM

## 2017-11-30 PROCEDURE — 99024 POSTOP FOLLOW-UP VISIT: CPT | Performed by: ORTHOPAEDIC SURGERY

## 2017-12-01 ENCOUNTER — HOSPITAL ENCOUNTER (OUTPATIENT)
Dept: OCCUPATIONAL THERAPY | Age: 42
Discharge: HOME | End: 2017-12-31
Attending: ORTHOPAEDIC SURGERY | Admitting: ORTHOPAEDIC SURGERY

## 2018-03-02 ENCOUNTER — EMPLOYEE WELLNESS (OUTPATIENT)
Dept: OTHER | Age: 43
End: 2018-03-02

## 2018-03-02 LAB
CHOLESTEROL, TOTAL: 165 MG/DL (ref 0–199)
GLUCOSE BLD-MCNC: 90 MG/DL (ref 70–99)
HDLC SERPL-MCNC: 56 MG/DL (ref 40–60)
LDL CHOLESTEROL CALCULATED: 95 MG/DL
TRIGL SERPL-MCNC: 71 MG/DL (ref 0–150)

## 2018-03-20 VITALS — WEIGHT: 245 LBS | BODY MASS INDEX: 39.54 KG/M2

## 2018-04-02 VITALS — BODY MASS INDEX: 40.21 KG/M2 | WEIGHT: 249 LBS

## 2019-03-25 ENCOUNTER — EMPLOYEE WELLNESS (OUTPATIENT)
Dept: OTHER | Age: 44
End: 2019-03-25

## 2019-03-25 LAB
CHOLESTEROL, TOTAL: 156 MG/DL (ref 0–199)
GLUCOSE BLD-MCNC: 78 MG/DL (ref 70–99)
HDLC SERPL-MCNC: 47 MG/DL (ref 40–60)
LDL CHOLESTEROL CALCULATED: 99 MG/DL
TRIGL SERPL-MCNC: 52 MG/DL (ref 0–150)

## 2019-04-01 VITALS — BODY MASS INDEX: 39.4 KG/M2 | WEIGHT: 244 LBS

## 2020-03-03 ENCOUNTER — HOSPITAL ENCOUNTER (OUTPATIENT)
Dept: WOMENS IMAGING | Age: 45
Discharge: HOME | End: 2020-03-03
Payer: COMMERCIAL

## 2020-03-03 PROCEDURE — 77067 SCR MAMMO BI INCL CAD: CPT

## 2021-04-29 ENCOUNTER — TELEPHONE (OUTPATIENT)
Dept: WOMENS IMAGING | Age: 46
End: 2021-04-29

## 2021-04-29 NOTE — TELEPHONE ENCOUNTER
Called and left message for patient that it was time to schedule annual mammogram. Left phone number for central scheduling [phone]

## 2021-07-08 LAB
CHOLESTEROL, TOTAL: 143 MG/DL (ref 0–199)
GLUCOSE BLD-MCNC: 88 MG/DL (ref 70–99)
HDLC SERPL-MCNC: 46 MG/DL (ref 40–60)
LDL CHOLESTEROL CALCULATED: 86 MG/DL
TRIGL SERPL-MCNC: 54 MG/DL (ref 0–150)

## 2022-07-25 LAB
CHOLESTEROL, TOTAL: 162 MG/DL (ref 0–199)
GLUCOSE BLD-MCNC: 78 MG/DL (ref 70–99)
HDLC SERPL-MCNC: 46 MG/DL (ref 40–60)
LDL CHOLESTEROL CALCULATED: 105 MG/DL
TRIGL SERPL-MCNC: 57 MG/DL (ref 0–150)

## 2023-05-01 LAB
CHOLEST SERPL-MCNC: 161 MG/DL (ref 0–199)
GLUCOSE SERPL-MCNC: 64 MG/DL (ref 70–99)
HDLC SERPL-MCNC: 49 MG/DL (ref 40–60)
LDLC SERPL CALC-MCNC: 100 MG/DL
TRIGL SERPL-MCNC: 61 MG/DL (ref 0–150)

## 2024-05-01 ENCOUNTER — HOSPITAL ENCOUNTER (OUTPATIENT)
Dept: WOMENS IMAGING | Age: 49
Discharge: HOME | End: 2024-05-01
Payer: COMMERCIAL

## 2024-05-01 VITALS — WEIGHT: 230 LBS | HEIGHT: 66 IN | BODY MASS INDEX: 36.96 KG/M2

## 2024-05-01 DIAGNOSIS — Z12.31 SCREENING MAMMOGRAM FOR BREAST CANCER: ICD-10-CM

## 2024-05-01 PROCEDURE — 77063 BREAST TOMOSYNTHESIS BI: CPT

## 2025-07-21 LAB
CHOLEST SERPL-MCNC: 163 MG/DL (ref 0–199)
GLUCOSE SERPL-MCNC: 88 MG/DL (ref 70–99)
HDLC SERPL-MCNC: 53 MG/DL (ref 40–60)
LDLC SERPL CALC-MCNC: 97 MG/DL
TRIGL SERPL-MCNC: 63 MG/DL (ref 0–150)